# Patient Record
Sex: FEMALE | Race: BLACK OR AFRICAN AMERICAN | Employment: FULL TIME | URBAN - METROPOLITAN AREA
[De-identification: names, ages, dates, MRNs, and addresses within clinical notes are randomized per-mention and may not be internally consistent; named-entity substitution may affect disease eponyms.]

---

## 2017-03-08 LAB
HBSAG, EXTERNAL: NORMAL
HIV, EXTERNAL: NONREACTIVE
RUBELLA, EXTERNAL: 12.4
TYPE, ABO & RH, EXTERNAL: NORMAL

## 2017-07-27 LAB
ANTIBODY SCREEN, EXTERNAL: NORMAL
T. PALLIDUM, EXTERNAL: NORMAL

## 2017-09-15 LAB — GRBS, EXTERNAL: NORMAL

## 2017-10-05 ENCOUNTER — ANESTHESIA (OUTPATIENT)
Dept: LABOR AND DELIVERY | Age: 32
End: 2017-10-05
Payer: COMMERCIAL

## 2017-10-05 ENCOUNTER — HOSPITAL ENCOUNTER (INPATIENT)
Age: 32
LOS: 3 days | Discharge: HOME | End: 2017-10-08
Attending: OBSTETRICS & GYNECOLOGY | Admitting: OBSTETRICS & GYNECOLOGY
Payer: COMMERCIAL

## 2017-10-05 ENCOUNTER — ANESTHESIA EVENT (OUTPATIENT)
Dept: LABOR AND DELIVERY | Age: 32
End: 2017-10-05
Payer: COMMERCIAL

## 2017-10-05 PROBLEM — Z34.90 PREGNANCY: Status: ACTIVE | Noted: 2017-10-05

## 2017-10-05 LAB
A1 MICROGLOB PLACENTAL VAG QL: POSITIVE
BASOPHILS # BLD: 0 K/UL (ref 0–0.1)
BASOPHILS NFR BLD: 0 % (ref 0–1)
CONTROL LINE PRESENT?: YES
EOSINOPHIL # BLD: 0.1 K/UL (ref 0–0.4)
EOSINOPHIL NFR BLD: 1 % (ref 0–7)
ERYTHROCYTE [DISTWIDTH] IN BLOOD BY AUTOMATED COUNT: 12.7 % (ref 11.5–14.5)
EXPIRATION DATE: NORMAL
HCT VFR BLD AUTO: 38.9 % (ref 35–47)
HGB BLD-MCNC: 13.5 G/DL (ref 11.5–16)
INTERNAL NEGATIVE CONTROL: NEGATIVE
KIT LOT NO.: NORMAL
LYMPHOCYTES # BLD: 2 K/UL (ref 0.8–3.5)
LYMPHOCYTES NFR BLD: 22 % (ref 12–49)
MCH RBC QN AUTO: 32.8 PG (ref 26–34)
MCHC RBC AUTO-ENTMCNC: 34.7 G/DL (ref 30–36.5)
MCV RBC AUTO: 94.4 FL (ref 80–99)
MONOCYTES # BLD: 0.9 K/UL (ref 0–1)
MONOCYTES NFR BLD: 10 % (ref 5–13)
NEUTS SEG # BLD: 6.1 K/UL (ref 1.8–8)
NEUTS SEG NFR BLD: 67 % (ref 32–75)
PLATELET # BLD AUTO: 155 K/UL (ref 150–400)
RBC # BLD AUTO: 4.12 M/UL (ref 3.8–5.2)
WBC # BLD AUTO: 9.1 K/UL (ref 3.6–11)

## 2017-10-05 PROCEDURE — 74011250636 HC RX REV CODE- 250/636: Performed by: ANESTHESIOLOGY

## 2017-10-05 PROCEDURE — 75410000002 HC LABOR FEE PER 1 HR

## 2017-10-05 PROCEDURE — 84112 EVAL AMNIOTIC FLUID PROTEIN: CPT | Performed by: OBSTETRICS & GYNECOLOGY

## 2017-10-05 PROCEDURE — 36415 COLL VENOUS BLD VENIPUNCTURE: CPT | Performed by: OBSTETRICS & GYNECOLOGY

## 2017-10-05 PROCEDURE — 74011250636 HC RX REV CODE- 250/636: Performed by: OBSTETRICS & GYNECOLOGY

## 2017-10-05 PROCEDURE — 4A0HXCZ MEASUREMENT OF PRODUCTS OF CONCEPTION, CARDIAC RATE, EXTERNAL APPROACH: ICD-10-PCS | Performed by: OBSTETRICS & GYNECOLOGY

## 2017-10-05 PROCEDURE — 85025 COMPLETE CBC W/AUTO DIFF WBC: CPT | Performed by: OBSTETRICS & GYNECOLOGY

## 2017-10-05 PROCEDURE — 74011250636 HC RX REV CODE- 250/636

## 2017-10-05 PROCEDURE — 65410000002 HC RM PRIVATE OB

## 2017-10-05 PROCEDURE — 77030034849

## 2017-10-05 PROCEDURE — 99282 EMERGENCY DEPT VISIT SF MDM: CPT

## 2017-10-05 PROCEDURE — 77030007880 HC KT SPN EPDRL BBMI -B

## 2017-10-05 PROCEDURE — 77030014125 HC TY EPDRL BBMI -B: Performed by: ANESTHESIOLOGY

## 2017-10-05 PROCEDURE — 76060000078 HC EPIDURAL ANESTHESIA

## 2017-10-05 RX ORDER — NALOXONE HYDROCHLORIDE 0.4 MG/ML
0.4 INJECTION, SOLUTION INTRAMUSCULAR; INTRAVENOUS; SUBCUTANEOUS AS NEEDED
Status: DISCONTINUED | OUTPATIENT
Start: 2017-10-05 | End: 2017-10-06 | Stop reason: HOSPADM

## 2017-10-05 RX ORDER — OXYTOCIN IN 5 % DEXTROSE 30/500 ML
PLASTIC BAG, INJECTION (ML) INTRAVENOUS
Status: DISCONTINUED
Start: 2017-10-05 | End: 2017-10-05

## 2017-10-05 RX ORDER — BUPIVACAINE HYDROCHLORIDE AND EPINEPHRINE 2.5; 5 MG/ML; UG/ML
INJECTION, SOLUTION EPIDURAL; INFILTRATION; INTRACAUDAL; PERINEURAL
Status: DISPENSED
Start: 2017-10-05 | End: 2017-10-06

## 2017-10-05 RX ORDER — BUPIVACAINE HYDROCHLORIDE AND EPINEPHRINE 2.5; 5 MG/ML; UG/ML
INJECTION, SOLUTION EPIDURAL; INFILTRATION; INTRACAUDAL; PERINEURAL AS NEEDED
Status: DISCONTINUED | OUTPATIENT
Start: 2017-10-05 | End: 2017-10-06 | Stop reason: HOSPADM

## 2017-10-05 RX ORDER — CALCIUM CARBONATE 200(500)MG
200 TABLET,CHEWABLE ORAL
Status: DISCONTINUED | OUTPATIENT
Start: 2017-10-06 | End: 2017-10-08 | Stop reason: HOSPADM

## 2017-10-05 RX ORDER — SODIUM CHLORIDE, SODIUM LACTATE, POTASSIUM CHLORIDE, CALCIUM CHLORIDE 600; 310; 30; 20 MG/100ML; MG/100ML; MG/100ML; MG/100ML
125 INJECTION, SOLUTION INTRAVENOUS CONTINUOUS
Status: DISCONTINUED | OUTPATIENT
Start: 2017-10-05 | End: 2017-10-06 | Stop reason: SDUPTHER

## 2017-10-05 RX ORDER — FENTANYL/BUPIVACAINE/NS/PF 2-1250MCG
1-16 PREFILLED PUMP RESERVOIR EPIDURAL CONTINUOUS
Status: DISCONTINUED | OUTPATIENT
Start: 2017-10-05 | End: 2017-10-06 | Stop reason: HOSPADM

## 2017-10-05 RX ORDER — FENTANYL CITRATE 50 UG/ML
INJECTION, SOLUTION INTRAMUSCULAR; INTRAVENOUS
Status: DISPENSED
Start: 2017-10-05 | End: 2017-10-06

## 2017-10-05 RX ORDER — OXYTOCIN IN 5 % DEXTROSE 30/500 ML
2 PLASTIC BAG, INJECTION (ML) INTRAVENOUS
Status: DISCONTINUED | OUTPATIENT
Start: 2017-10-05 | End: 2017-10-06 | Stop reason: HOSPADM

## 2017-10-05 RX ORDER — SODIUM CHLORIDE 0.9 % (FLUSH) 0.9 %
5-10 SYRINGE (ML) INJECTION AS NEEDED
Status: DISCONTINUED | OUTPATIENT
Start: 2017-10-05 | End: 2017-10-06 | Stop reason: HOSPADM

## 2017-10-05 RX ORDER — SODIUM CHLORIDE 0.9 % (FLUSH) 0.9 %
5-10 SYRINGE (ML) INJECTION AS NEEDED
Status: DISCONTINUED | OUTPATIENT
Start: 2017-10-05 | End: 2017-10-08 | Stop reason: HOSPADM

## 2017-10-05 RX ORDER — SODIUM CHLORIDE 0.9 % (FLUSH) 0.9 %
5-10 SYRINGE (ML) INJECTION EVERY 8 HOURS
Status: DISCONTINUED | OUTPATIENT
Start: 2017-10-05 | End: 2017-10-08 | Stop reason: HOSPADM

## 2017-10-05 RX ORDER — SODIUM CHLORIDE 0.9 % (FLUSH) 0.9 %
5-10 SYRINGE (ML) INJECTION EVERY 8 HOURS
Status: DISCONTINUED | OUTPATIENT
Start: 2017-10-05 | End: 2017-10-06 | Stop reason: HOSPADM

## 2017-10-05 RX ORDER — FENTANYL/BUPIVACAINE/NS/PF 2-1250MCG
PREFILLED PUMP RESERVOIR EPIDURAL
Status: COMPLETED
Start: 2017-10-05 | End: 2017-10-05

## 2017-10-05 RX ADMIN — BUPIVACAINE HYDROCHLORIDE AND EPINEPHRINE 5 ML: 2.5; 5 INJECTION, SOLUTION EPIDURAL; INFILTRATION; INTRACAUDAL; PERINEURAL at 12:45

## 2017-10-05 RX ADMIN — FENTANYL 0.2 MG/100ML-BUPIV 0.125%-NACL 0.9% EPIDURAL INJ 12 ML/HR: 2/0.125 SOLUTION at 20:15

## 2017-10-05 RX ADMIN — BUPIVACAINE HYDROCHLORIDE AND EPINEPHRINE 0.5 ML: 2.5; 5 INJECTION, SOLUTION EPIDURAL; INFILTRATION; INTRACAUDAL; PERINEURAL at 12:44

## 2017-10-05 RX ADMIN — Medication 2 MILLI-UNITS/MIN: at 20:11

## 2017-10-05 RX ADMIN — BUPIVACAINE HYDROCHLORIDE AND EPINEPHRINE 5 ML: 2.5; 5 INJECTION, SOLUTION EPIDURAL; INFILTRATION; INTRACAUDAL; PERINEURAL at 12:50

## 2017-10-05 RX ADMIN — FENTANYL 0.2 MG/100ML-BUPIV 0.125%-NACL 0.9% EPIDURAL INJ 12 ML/HR: 2/0.125 SOLUTION at 12:59

## 2017-10-05 RX ADMIN — SODIUM CHLORIDE, SODIUM LACTATE, POTASSIUM CHLORIDE, AND CALCIUM CHLORIDE 125 ML/HR: 600; 310; 30; 20 INJECTION, SOLUTION INTRAVENOUS at 06:38

## 2017-10-05 RX ADMIN — SODIUM CHLORIDE, SODIUM LACTATE, POTASSIUM CHLORIDE, AND CALCIUM CHLORIDE 125 ML/HR: 600; 310; 30; 20 INJECTION, SOLUTION INTRAVENOUS at 12:28

## 2017-10-05 RX ADMIN — SODIUM CHLORIDE, SODIUM LACTATE, POTASSIUM CHLORIDE, AND CALCIUM CHLORIDE 125 ML/HR: 600; 310; 30; 20 INJECTION, SOLUTION INTRAVENOUS at 19:40

## 2017-10-05 NOTE — PROGRESS NOTES
1022: Dr. Parish Godinez at bedside. SVE performed. 10/05/17 1022   Cervical Exam   Dilation (cm) 2   Eff 80 %   Station -1   Position Posterior   Vaginal exam done by? Dr. Parish Godinez     POC epidural if needed.

## 2017-10-05 NOTE — PROGRESS NOTES
0715: Bedside report received from SHANTHI Luna RN. Will assume care. 0745: Pt taken off EFM. Sitting on ball in room with .

## 2017-10-05 NOTE — ANESTHESIA PREPROCEDURE EVALUATION
Anesthetic History   No history of anesthetic complications            Review of Systems / Medical History  Patient summary reviewed, nursing notes reviewed and pertinent labs reviewed    Pulmonary  Within defined limits                 Neuro/Psych   Within defined limits           Cardiovascular  Within defined limits                Exercise tolerance: >4 METS     GI/Hepatic/Renal  Within defined limits              Endo/Other  Within defined limits           Other Findings   Comments: IUP           Physical Exam    Airway  Mallampati: II  TM Distance: 4 - 6 cm  Neck ROM: normal range of motion   Mouth opening: Normal     Cardiovascular               Dental  No notable dental hx       Pulmonary                 Abdominal         Other Findings            Anesthetic Plan    ASA: 2  Anesthesia type: spinal and epidural            Anesthetic plan and risks discussed with: Patient

## 2017-10-05 NOTE — PROGRESS NOTES
PROM around 2300  GBS negative  Managing expectantly.      Cvx: 2/80/-1  [place]: irregular  FHR: 120s, category 1    Pain management options reviewed  Continue expectant management of labor

## 2017-10-05 NOTE — PROGRESS NOTES
Late entyr 56  admitted to triage with c/o LOF since 2300 clear fluid- amniosure was done and positive. Pt denies any problems withpregnancy.  Is planning a \"nonmedicated delivery\" pt admits to [facility]

## 2017-10-05 NOTE — PROGRESS NOTES
1500: Dr. Suukmar Valverde at bedside. SVE performed. 10/05/17 1500   Cervical Exam   Dilation (cm) 4   Eff 100 %   Station 0   Vaginal exam done by? Dr. Sukumar Valverde     POC continue position changes every hour. Will continue to monitor.

## 2017-10-05 NOTE — PROGRESS NOTES
1235: Dr. Danielle Nguyen at bedside. Epidural discussed, opportunities for questions given. Understanding voiced. Timeout performed. Successful epidural performed.

## 2017-10-05 NOTE — IP AVS SNAPSHOT
Summary of Care Report The Summary of Care report has been created to help improve care coordination. Users with access to [employer] or [address] (Web-based application) may access additional patient information including the Discharge Summary. If you are not currently a [address] user and need more information, please call the number listed below in the [place] [address] section and ask to be connected with Medical Records. Facility Information Name Address Phone [address] [address] [phone] Patient Information Patient Name Sex DOB Haynes Ormond ([id number]) Female 1985 Discharge Information Admitting Provider Service Area Unit Tej Terrazas MD / [phone] [address] [facility] 3 Mother Infant / [phone] Discharge Provider Discharge Date/Time Discharge Disposition Destination (none) 10/8/2017 Midday (Pending) AHR (none) Patient Language Language ENGLISH [13] Hospital Problems as of 10/8/2017  Reviewed: 10/6/2017 10:43 AM by Breanne Mcintyre MD  
  
  
  
 Class Noted - Resolved Last Modified POA Active Problems Pregnancy  10/5/2017 - Present 10/5/2017 by Tej Terrazas MD Unknown Entered by Tej Terrazas MD  
  
Non-Hospital Problems as of 10/8/2017  Reviewed: 10/6/2017 10:43 AM by Breanne Mcintyre MD  
 None You are allergic to the following Allergen Reactions Benadryl (Diphenhydramine Hcl) Anaphylaxis Current Discharge Medication List  
  
START taking these medications Dose & Instructions Dispensing Information Comments  
 ferrous sulfate 325 mg (65 mg iron) tablet Dose:  325 mg Take 1 Tab by mouth daily (after breakfast). Quantity:  60 Tab Refills:  0  
   
 ibuprofen 600 mg tablet Commonly known as:  MOTRIN  Dose:  600 mg  
 Take 1 Tab by mouth every six (6) hours as needed for Pain. Quantity:  40 Tab Refills:  2  
   
 oxyCODONE-acetaminophen 5-325 mg per tablet Commonly known as:  PERCOCET Dose:  1 Tab Take 1 Tab by mouth every six (6) hours as needed for Pain. Max Daily Amount: 4 Tabs. Quantity:  30 Tab Refills:  0 CONTINUE these medications which have NOT CHANGED Dose & Instructions Dispensing Information Comments PNV38-Iron Cbn&Gluc-FA-DSS-DHA 35-1- mg Cmpk Take  by mouth. Refills:  0 Surgery Information ID Date/Time Status Primary Surgeon All Procedures Location [id number] 10/5/2017 Unposted   [facility] - DO NOT SCHEDULE    
 [id number] 10/6/2017 1100 Unposted Aria Hawkins MD  SECTION [facility] L[facility] OR Follow-up Information Follow up With Details Comments Contact Info Dr. Dinora Holliday In 6 weeks postpartum appointment Provider Unknown   Patient not available to ask Discharge Instructions POST DELIVERY DISCHARGE INSTRUCTIONS Name: Peyman George YOB: 1985 Primary Diagnosis: Active Problems: 
  Pregnancy (10/5/2017) General:  
 
Diet/Diet Restrictions: 
· Eight 8-ounce glasses of fluid daily (water, juices); avoid excessive caffeine intake. · Meals/snacks as desired which are high in fiber and carbohydrates and low in fat and cholesterol. Physical Activity / Restrictions / Safety: · Avoid heavy lifting, no more that 8 lbs. For 2-3 weeks;  
· Limit use of stairs to 2 times daily for the first week home. · No driving for one week. · Avoid intercourse 4-6 weeks, no douching or tampon use. · Check with obstetrician before starting or resuming an exercise program.   
 
Discharge Instructions/Special Treatment/Home Care Needs:  
 
· Continue prenatal vitamins. · Continue to use squirt bottle with warm water on your episiotomy after each bathroom use until bleeding stops. · If steri-strips applied to your incision, remove in 7-10 days. Call your doctor for the following: · Fever over 101 degrees by mouth. · Vaginal bleeding heavier than a normal menstrual period or clots larger than a golf ball. · Red streaks or increased swelling of legs, painful red streaks on your breast. 
· Painful urination, constipation and increased pain or swelling or discharge with your incision. · If you feel extremely anxious or overwhelmed. · If you have thoughts of harming yourself and/or your baby. Pain Management:  
 
· Take Acetaminophen (Tylenol) or Ibuprofen (Advil, Motrin), as directed for pain. · Use a warm Sitz bath 3 times daily to relieve episiotomy or hemorrhoidal discomfort. · For hemorrhoidal discomfort, use Tucks and Anusol cream as needed and directed. · Heating pad to  incision as needed. Follow-Up Care:  
 
Appointment with MD: Follow-up Appointments Procedures  FOLLOW UP VISIT Appointment in: 6 Weeks Standing Status:   Standing Number of Occurrences:   1 Order Specific Question:   Appointment in Answer:   6 Weeks Telephone number: [phone] Signed By: Linda Jerry MD                                                                                                   Date: 10/8/2017 Time: 11:01 AM 
 
Chart Review Routing History Recipient Method Report Sent By Mattie Robb Provider Gretchen, MD  
Patient not available to ask [address] Mail IP Auto Routed Notes Aditi Hdz MD [[id number]] 10/5/2017  3:36 AM 10/05/2017 Provider Unknown, MD  
Patient not available to ask [address] [employer] Mail IP Auto Routed Notes Linda Jerry MD [[id number]] 10/8/2017 11:01 AM 10/08/2017

## 2017-10-05 NOTE — H&P
EDC:10/12/2017  EGA: 39 weeks, 0 days      History of Present Illness:  28year old  at 39w0d presents with PROM clear fluid about 2300 tonight. A little spotting initially, none since. Baby active. Denies significant problems with this pregnancy. GBS negative. Patient's Prenatal Care with Doctor of Record Dayanara Chowdhury MD Notable For -    Leukorrhea  DM increased risk early GS (nml)   lab screening  Normal pregnancy primigravida  PCOS  Hirsutism      Review of Systems     Except as noted in the HPI, the review of systems is negative for General, Breast, , CV, Resp, GI, Endo, MS, Derm, Neuro, Psych, Eyes, ENT, Allergy and Heme. Allergies    BENADRYL (Moderate)      Medications Removed from Medication List        Flowsheet View for Follow-up Visit     Estimated weeks of        gestation:  39 0/7     Blood pressure: 122 / 82     FHR:   130s     Fetal position:  vertex     Cx Dilation:  FT     Cx Effacement: 80%     Cx Station:  -1      Vital Signs    Blood Pressure: 122 / 82  Temperature:  98.6 deg.  F.  Pulse rate: 87 / minute  Resp rate: 20 / minute    FHT Descrip:    reactive  Contractions:  yes  UC Frequency:  occasional          Physical Exam     General           General appearance:  no acute distress    Chest           Lungs:  clear to auscultation          Heart:  regular rate and rhythm    Extremeties           Extremeties:  0 edema    Neurological           Reflexes:  2+ and symmetric with no pathological reflexes    Psych           Orientation:  oriented to time, place, and person    Abdomen           Abdomen:  gravid; soft; nontender          EFW:  7.5#    Pelvic Exam           EGBUS:  no lesions          Vagina:  small amount clear fluid                  Dilation: : FT                  Effacement:  80%                  Station:  -1                  Presentation:  vertex                  Membranes:  ruptured                  Pelvis:  adequate Consistency:  soft                  Position: posterior    Other Physical Exam Findings           Amnisure positive            Impression & Recommendations:    Problem # 1:  ROM at term (FCM-496.08) (WTY17-I22.73)  Assessment: New  [facility]  GBS negative  Discussed management--patient wants to manage expectantly for now. Other Orders:  Sent to L&D (CPT-AdmitF)      Medications (at conclusion of this visit)    05/01/2017 ERYTHROMYCIN 2 % EXT GEL (ERYTHROMYCIN) Prescribed by arnie Steele/70Geno Yang MD  12/09/2016 PRENATAL COMPLETE TABS (PRENATAL VIT-FE FUMARATE-FA TABS) over the counter          LABORATORY DATA   TEST DATE RESULT   Group B Strep culture 09/15/2017 Negative                                   (Group B Strep Culture Result Field)   Blood Type 03/08/2017 B                                             (Blood Type Result Field)   Rh 03/08/2017 Positive                                   (Rh Result Field)   Rhogam Inj Given     Tdap Vaccine Given 07/27/2017 Vacc. [address]   Antibody Screen 07/27/2017 Negative   Rubella  [facility] Reference Ranges On or After 3/10/14                  <0.90              Non-immune      0.90 - 0.99     Equivocal      >0.99              Immune    [facility] Reference Ranges  Before 3/10/14           <5                 Non-immune             5 - 9               Equivocal            >9                 Immune  [facility] Reference Ranges       < Or = 0.90       Negative             0.91-1.09          Equivocal            > Or = 1.10       Positive   03/08/2017     12.40     TPA (T Pallidum Antibodies) 07/27/2017 Negative   Serology (RPR) 05/08/2013 Non Reactive   HBsAg 03/08/2017 Negative   HIV 03/08/2017 Non Reactive   Hemoglobin 07/27/2017 12.9   Hematocrit 07/27/2017 37.8   Platelets 09/74/5028 168 X10E3/UL   TSH 10/10/2016 1.250   Urine Culture 02/21/2017 Negative   GC DNA Probe 02/21/2017 Negative   Chlamydia DNA 02/21/2017 Negative   PAP 06/02/2015 NIL   Flu Vaccine Given 09/20/2017 Vacc.  [address]   HGBA1C 12/19/2016 5.3   HGB Electro 03/08/2017 AA   T4, Free     BG Fasting     GTT 1H 50G 07/27/2017 123   GTT 1H 100G     GTT 2H 100G     GTT 3H 100G     Glucose Plasma     CF Accept or Decline 03/08/2017 declined   CF Screen Result     Nuchal Trans 05/31/2017 4.61^4. 61 mm&millimeters   AFP Only 05/01/2017 *Screen Negative*   Tetra     AFP Serum     CVS 03/08/2017 declined   AFP Amniotic     Amnio Karyo 03/08/2017 declined   FISH     GC Culture     Chlamydia Cult     Ureaplasma     Mycoplasma     WBC 03/08/2017 5.9 X10E3/UL   RBC 03/08/2017 4.22 X10E6/UL   MCV 03/08/2017 93   MCH 03/08/2017 31.8   MCHC RBC 03/08/2017 34.0     ULTRASOUND DATA   TEST DATE RESULT   Estimated Fetal Weight 09/06/2017 1572^5999 g&grams                                     Weight % 09/06/2017 59^59% %&percent                                                ALMA DELIA 09/06/2017 18.13^18.1 cm&centimeters                    BPP 09/06/2017 8^8 [n/a]&Not applicable   Cervical Length (mm)             Electronically signed by Nelson Manley MD  on 10/05/2017 at 3:35 AM    ________________________________________________________________________

## 2017-10-05 NOTE — ANESTHESIA PROCEDURE NOTES
CSE Block    Performed by: Keshia Ham  Authorized by: Minh Colin     Pre-Procedure      OB Combined Spinal-Epidural Note    Risk and benefits were discussed with the patient and plans are to proceed. Patient was placed in the seated position. The back was prepped at the lumbar region with Duraprep. 3 ml 0.25% bupivacaine with 1:200K epinephrine  was used as local at L3 - L4. A 17 Tuohy needle was passed x 1 attempt(s) at the above stated level. Loss of resistance at 5 cm. A 25 g pencil point spinal needle was placed through the Touhy until CSF was obtained. 0.5 mL 0.25% bupivacaine with 1:200K epinephrine was deposited into the CSF. A 19 g spring type epidural catheter was placed through the Touhy and secured at 11 cm at the skin. Test dose with 5 mL 0.25% bupivacaine with 1:200 epinephrine was negative. No paresthesia.     Urban Ross MD  10/5/2017

## 2017-10-05 NOTE — IP AVS SNAPSHOT
[address] [facility] 
[phone] Patient: Peyman George MRN: [id number] CIQ:2/57/8180 You are allergic to the following Allergen Reactions Benadryl (Diphenhydramine Hcl) Anaphylaxis Recent Documentation Height Weight Breastfeeding? BMI OB Status Smoking Status 1.676 m 82.6 kg Unknown 29.38 kg/m2 Recent pregnancy Never Smoker Unresulted Labs Order Current Status SAMPLE TO BLOOD BANK In process RUPTURE OF FETAL MEMBRANES, POC Preliminary result Emergency Contacts Name Discharge Info Relation Home Work Mobile Aime Jerry  Spouse [3] [phone] About your hospitalization You were admitted on:  October 5, 2017 You last received care in the:  [facility] You were discharged on:  October 8, 2017 Unit phone number:  [phone] Why you were hospitalized Your primary diagnosis was:  Not on File Your diagnoses also included:  Pregnancy Providers Seen During Your Hospitalizations Provider Role Specialty Primary office phone Benjamin Jensen MD Attending Provider Obstetrics & Gynecology [phone] Your Primary Care Physician (PCP) Primary Care Physician Office Phone Office Fax UNKNOWN, PROVIDER ** None ** ** None ** Follow-up Information Follow up With Details Comments Contact Info Dr. Dinora Holliday In 6 weeks postpartum appointment Provider Unknown   Patient not available to ask Current Discharge Medication List  
  
START taking these medications Dose & Instructions Dispensing Information Comments Morning Noon Evening Bedtime  
 ferrous sulfate 325 mg (65 mg iron) tablet Your last dose was: Your next dose is:    
   
   
 Dose:  325 mg Take 1 Tab by mouth daily (after breakfast). Quantity:  60 Tab Refills:  0 ibuprofen 600 mg tablet Commonly known as:  MOTRIN Your last dose was: Your next dose is:    
   
   
 Dose:  600 mg Take 1 Tab by mouth every six (6) hours as needed for Pain. Quantity:  40 Tab Refills:  2  
     
   
   
   
  
 oxyCODONE-acetaminophen 5-325 mg per tablet Commonly known as:  PERCOCET Your last dose was: Your next dose is:    
   
   
 Dose:  1 Tab Take 1 Tab by mouth every six (6) hours as needed for Pain. Max Daily Amount: 4 Tabs. Quantity:  30 Tab Refills:  0 CONTINUE these medications which have NOT CHANGED Dose & Instructions Dispensing Information Comments Morning Noon Evening Bedtime PNV38-Iron Cbn&Gluc-FA-DSS-DHA 35-1- mg Cmpk Your last dose was: Your next dose is: Take  by mouth. Refills:  0 Where to Get Your Medications Information on where to get these meds will be given to you by the nurse or doctor. ! Ask your nurse or doctor about these medications  
  ferrous sulfate 325 mg (65 mg iron) tablet  
 ibuprofen 600 mg tablet  
 oxyCODONE-acetaminophen 5-325 mg per tablet Discharge Instructions POST DELIVERY DISCHARGE INSTRUCTIONS Name: Nicole Gao YOB: 1985 Primary Diagnosis: Active Problems: 
  Pregnancy (10/5/2017) General:  
 
Diet/Diet Restrictions: 
· Eight 8-ounce glasses of fluid daily (water, juices); avoid excessive caffeine intake. · Meals/snacks as desired which are high in fiber and carbohydrates and low in fat and cholesterol. Physical Activity / Restrictions / Safety: · Avoid heavy lifting, no more that 8 lbs. For 2-3 weeks;  
· Limit use of stairs to 2 times daily for the first week home. · No driving for one week. · Avoid intercourse 4-6 weeks, no douching or tampon use.   
· Check with obstetrician before starting or resuming an exercise program.   
 
 Discharge Instructions/Special Treatment/Home Care Needs:  
 
· Continue prenatal vitamins. · Continue to use squirt bottle with warm water on your episiotomy after each bathroom use until bleeding stops. · If steri-strips applied to your incision, remove in 7-10 days. Call your doctor for the following: · Fever over 101 degrees by mouth. · Vaginal bleeding heavier than a normal menstrual period or clots larger than a golf ball. · Red streaks or increased swelling of legs, painful red streaks on your breast. 
· Painful urination, constipation and increased pain or swelling or discharge with your incision. · If you feel extremely anxious or overwhelmed. · If you have thoughts of harming yourself and/or your baby. Pain Management:  
 
· Take Acetaminophen (Tylenol) or Ibuprofen (Advil, Motrin), as directed for pain. · Use a warm Sitz bath 3 times daily to relieve episiotomy or hemorrhoidal discomfort. · For hemorrhoidal discomfort, use Tucks and Anusol cream as needed and directed. · Heating pad to  incision as needed. Follow-Up Care:  
 
Appointment with MD: Follow-up Appointments Procedures  FOLLOW UP VISIT Appointment in: 6 Weeks Standing Status:   Standing Number of Occurrences:   1 Order Specific Question:   Appointment in Answer:   6 Weeks Telephone number: [phone] Signed By: Luz Elena Curiel MD                                                                                                   Date: 10/8/2017 Time: 11:01 AM 
 
Discharge Orders None Phonitive - [employer] Announcement We are excited to announce that we are making your provider's discharge notes available to you in [employer]. You will see these notes when they are completed and signed by the physician that discharged you from your recent hospital stay.   If you have any questions or concerns about any information you see in [employer], please call the [employer] Please provide this summary of care documentation to your next provider. Patient Signature:  ____________________________________________________________ Date:  ____________________________________________________________  
  
Gunnar Mais Provider Signature:  ____________________________________________________________ Date:  ____________________________________________________________

## 2017-10-05 NOTE — PROGRESS NOTES
Pt is starting to be more aware of her contractions- getting a  little restless with the contractions.  Encouragement given

## 2017-10-05 NOTE — PROGRESS NOTES
1600: Deep variable/late noted. Turned L lateral & trendelenberg. Baby recovered well. . Will continue to monitor.

## 2017-10-05 NOTE — PROGRESS NOTES
Bedside shift change report given to SHANTHI Barnes RN by FRANCIA Stanton RN. Report included the following information SBAR, Kardex, Intake/Output, MAR, Accordion, Recent Results and Med Rec Status. Hourly Rounding performed by MUSTAPHA White RN

## 2017-10-06 PROCEDURE — 74011250636 HC RX REV CODE- 250/636: Performed by: ANESTHESIOLOGY

## 2017-10-06 PROCEDURE — 77030031139 HC SUT VCRL2 J&J -A

## 2017-10-06 PROCEDURE — 74011000250 HC RX REV CODE- 250

## 2017-10-06 PROCEDURE — 77010026065 HC OXYGEN MINIMUM MEDICAL AIR

## 2017-10-06 PROCEDURE — 65410000002 HC RM PRIVATE OB

## 2017-10-06 PROCEDURE — 74011250636 HC RX REV CODE- 250/636: Performed by: OBSTETRICS & GYNECOLOGY

## 2017-10-06 PROCEDURE — 74011250636 HC RX REV CODE- 250/636

## 2017-10-06 PROCEDURE — 76010000391 HC C SECN FIRST 1 HR: Performed by: OBSTETRICS & GYNECOLOGY

## 2017-10-06 PROCEDURE — 76060000078 HC EPIDURAL ANESTHESIA: Performed by: OBSTETRICS & GYNECOLOGY

## 2017-10-06 PROCEDURE — 76010000392 HC C SECN EA ADDL 0.5 HR: Performed by: OBSTETRICS & GYNECOLOGY

## 2017-10-06 PROCEDURE — 77030018809 HC RETRCTR ALXSO DISP AMR -B

## 2017-10-06 PROCEDURE — 77030010848 HC CATH INTUTR PRSS KOLB -B

## 2017-10-06 PROCEDURE — 76060000033 HC ANESTHESIA 1 TO 1.5 HR: Performed by: OBSTETRICS & GYNECOLOGY

## 2017-10-06 PROCEDURE — 77030018836 HC SOL IRR NACL ICUM -A

## 2017-10-06 PROCEDURE — 77030008459 HC STPLR SKN COOP -B

## 2017-10-06 PROCEDURE — 75410000002 HC LABOR FEE PER 1 HR

## 2017-10-06 PROCEDURE — 77030002933 HC SUT MCRYL J&J -A

## 2017-10-06 PROCEDURE — 77030011640 HC PAD GRND REM COVD -A

## 2017-10-06 PROCEDURE — 75410000003 HC RECOV DEL/VAG/CSECN EA 0.5 HR

## 2017-10-06 RX ORDER — IBUPROFEN 400 MG/1
800 TABLET ORAL EVERY 8 HOURS
Status: DISCONTINUED | OUTPATIENT
Start: 2017-10-06 | End: 2017-10-08 | Stop reason: HOSPADM

## 2017-10-06 RX ORDER — ONDANSETRON 2 MG/ML
4 INJECTION INTRAMUSCULAR; INTRAVENOUS
Status: DISCONTINUED | OUTPATIENT
Start: 2017-10-06 | End: 2017-10-08 | Stop reason: HOSPADM

## 2017-10-06 RX ORDER — OXYTOCIN/RINGER'S LACTATE 20/1000 ML
PLASTIC BAG, INJECTION (ML) INTRAVENOUS
Status: DISCONTINUED | OUTPATIENT
Start: 2017-10-06 | End: 2017-10-06

## 2017-10-06 RX ORDER — OXYCODONE AND ACETAMINOPHEN 5; 325 MG/1; MG/1
1 TABLET ORAL
Status: DISCONTINUED | OUTPATIENT
Start: 2017-10-06 | End: 2017-10-08 | Stop reason: HOSPADM

## 2017-10-06 RX ORDER — SODIUM CHLORIDE 9 MG/ML
150 INJECTION, SOLUTION INTRAVENOUS CONTINUOUS
Status: DISCONTINUED | OUTPATIENT
Start: 2017-10-06 | End: 2017-10-08 | Stop reason: HOSPADM

## 2017-10-06 RX ORDER — KETOROLAC TROMETHAMINE 30 MG/ML
30 INJECTION, SOLUTION INTRAMUSCULAR; INTRAVENOUS
Status: ACTIVE | OUTPATIENT
Start: 2017-10-06 | End: 2017-10-07

## 2017-10-06 RX ORDER — NALOXONE HYDROCHLORIDE 0.4 MG/ML
0.4 INJECTION, SOLUTION INTRAMUSCULAR; INTRAVENOUS; SUBCUTANEOUS AS NEEDED
Status: CANCELLED | OUTPATIENT
Start: 2017-10-06

## 2017-10-06 RX ORDER — CEFAZOLIN SODIUM IN 0.9 % NACL 2 G/100 ML
PLASTIC BAG, INJECTION (ML) INTRAVENOUS
Status: DISPENSED
Start: 2017-10-06 | End: 2017-10-06

## 2017-10-06 RX ORDER — SODIUM CHLORIDE, SODIUM LACTATE, POTASSIUM CHLORIDE, CALCIUM CHLORIDE 600; 310; 30; 20 MG/100ML; MG/100ML; MG/100ML; MG/100ML
125 INJECTION, SOLUTION INTRAVENOUS CONTINUOUS
Status: DISCONTINUED | OUTPATIENT
Start: 2017-10-06 | End: 2017-10-08 | Stop reason: HOSPADM

## 2017-10-06 RX ORDER — MORPHINE SULFATE 0.5 MG/ML
INJECTION, SOLUTION EPIDURAL; INTRATHECAL; INTRAVENOUS AS NEEDED
Status: DISCONTINUED | OUTPATIENT
Start: 2017-10-06 | End: 2017-10-06 | Stop reason: HOSPADM

## 2017-10-06 RX ORDER — LIDOCAINE HYDROCHLORIDE AND EPINEPHRINE 20; 5 MG/ML; UG/ML
INJECTION, SOLUTION EPIDURAL; INFILTRATION; INTRACAUDAL; PERINEURAL AS NEEDED
Status: DISCONTINUED | OUTPATIENT
Start: 2017-10-06 | End: 2017-10-06

## 2017-10-06 RX ORDER — LIDOCAINE HYDROCHLORIDE AND EPINEPHRINE 20; 5 MG/ML; UG/ML
INJECTION, SOLUTION EPIDURAL; INFILTRATION; INTRACAUDAL; PERINEURAL
Status: DISPENSED
Start: 2017-10-06 | End: 2017-10-07

## 2017-10-06 RX ORDER — OXYTOCIN/RINGER'S LACTATE 20/1000 ML
125-500 PLASTIC BAG, INJECTION (ML) INTRAVENOUS ONCE
Status: ACTIVE | OUTPATIENT
Start: 2017-10-06 | End: 2017-10-07

## 2017-10-06 RX ORDER — OXYTOCIN/RINGER'S LACTATE 20/1000 ML
PLASTIC BAG, INJECTION (ML) INTRAVENOUS
Status: DISCONTINUED | OUTPATIENT
Start: 2017-10-06 | End: 2017-10-06 | Stop reason: HOSPADM

## 2017-10-06 RX ORDER — KETOROLAC TROMETHAMINE 30 MG/ML
15 INJECTION, SOLUTION INTRAMUSCULAR; INTRAVENOUS EVERY 6 HOURS
Status: CANCELLED | OUTPATIENT
Start: 2017-10-06 | End: 2017-10-07

## 2017-10-06 RX ORDER — NALOXONE HYDROCHLORIDE 0.4 MG/ML
0.4 INJECTION, SOLUTION INTRAMUSCULAR; INTRAVENOUS; SUBCUTANEOUS AS NEEDED
Status: DISCONTINUED | OUTPATIENT
Start: 2017-10-06 | End: 2017-10-08 | Stop reason: HOSPADM

## 2017-10-06 RX ORDER — SIMETHICONE 80 MG
80 TABLET,CHEWABLE ORAL AS NEEDED
Status: DISCONTINUED | OUTPATIENT
Start: 2017-10-06 | End: 2017-10-08 | Stop reason: HOSPADM

## 2017-10-06 RX ORDER — ONDANSETRON 2 MG/ML
INJECTION INTRAMUSCULAR; INTRAVENOUS AS NEEDED
Status: DISCONTINUED | OUTPATIENT
Start: 2017-10-06 | End: 2017-10-06 | Stop reason: HOSPADM

## 2017-10-06 RX ORDER — LIDOCAINE HYDROCHLORIDE AND EPINEPHRINE 20; 5 MG/ML; UG/ML
INJECTION, SOLUTION EPIDURAL; INFILTRATION; INTRACAUDAL; PERINEURAL AS NEEDED
Status: DISCONTINUED | OUTPATIENT
Start: 2017-10-06 | End: 2017-10-06 | Stop reason: HOSPADM

## 2017-10-06 RX ORDER — SODIUM CHLORIDE 0.9 % (FLUSH) 0.9 %
5-10 SYRINGE (ML) INJECTION EVERY 8 HOURS
Status: DISCONTINUED | OUTPATIENT
Start: 2017-10-06 | End: 2017-10-08 | Stop reason: HOSPADM

## 2017-10-06 RX ORDER — PHENYLEPHRINE HCL IN 0.9% NACL 0.4MG/10ML
SYRINGE (ML) INTRAVENOUS AS NEEDED
Status: DISCONTINUED | OUTPATIENT
Start: 2017-10-06 | End: 2017-10-06 | Stop reason: HOSPADM

## 2017-10-06 RX ORDER — CEFAZOLIN SODIUM IN 0.9 % NACL 2 G/100 ML
PLASTIC BAG, INJECTION (ML) INTRAVENOUS AS NEEDED
Status: DISCONTINUED | OUTPATIENT
Start: 2017-10-06 | End: 2017-10-06 | Stop reason: HOSPADM

## 2017-10-06 RX ORDER — ONDANSETRON 2 MG/ML
4 INJECTION INTRAMUSCULAR; INTRAVENOUS
Status: CANCELLED | OUTPATIENT
Start: 2017-10-06

## 2017-10-06 RX ORDER — LIDOCAINE HYDROCHLORIDE AND EPINEPHRINE 20; 5 MG/ML; UG/ML
INJECTION, SOLUTION EPIDURAL; INFILTRATION; INTRACAUDAL; PERINEURAL
Status: DISPENSED
Start: 2017-10-06 | End: 2017-10-06

## 2017-10-06 RX ORDER — SODIUM CHLORIDE 0.9 % (FLUSH) 0.9 %
5-10 SYRINGE (ML) INJECTION AS NEEDED
Status: DISCONTINUED | OUTPATIENT
Start: 2017-10-06 | End: 2017-10-08 | Stop reason: HOSPADM

## 2017-10-06 RX ORDER — ACETAMINOPHEN 10 MG/ML
INJECTION, SOLUTION INTRAVENOUS AS NEEDED
Status: DISCONTINUED | OUTPATIENT
Start: 2017-10-06 | End: 2017-10-06 | Stop reason: HOSPADM

## 2017-10-06 RX ADMIN — Medication 80 MCG: at 11:51

## 2017-10-06 RX ADMIN — Medication: at 11:18

## 2017-10-06 RX ADMIN — Medication 80 MCG: at 11:43

## 2017-10-06 RX ADMIN — SODIUM CHLORIDE 999 ML/HR: 900 INJECTION, SOLUTION INTRAVENOUS at 02:15

## 2017-10-06 RX ADMIN — ONDANSETRON 4 MG: 2 INJECTION INTRAMUSCULAR; INTRAVENOUS at 20:16

## 2017-10-06 RX ADMIN — SODIUM CHLORIDE, SODIUM LACTATE, POTASSIUM CHLORIDE, AND CALCIUM CHLORIDE 125 ML/HR: 600; 310; 30; 20 INJECTION, SOLUTION INTRAVENOUS at 08:05

## 2017-10-06 RX ADMIN — Medication 120 MCG: at 11:41

## 2017-10-06 RX ADMIN — ONDANSETRON 4 MG: 2 INJECTION INTRAMUSCULAR; INTRAVENOUS at 11:43

## 2017-10-06 RX ADMIN — FENTANYL 0.2 MG/100ML-BUPIV 0.125%-NACL 0.9% EPIDURAL INJ 12 ML/HR: 2/0.125 SOLUTION at 03:03

## 2017-10-06 RX ADMIN — SODIUM CHLORIDE, SODIUM LACTATE, POTASSIUM CHLORIDE, AND CALCIUM CHLORIDE 125 ML/HR: 600; 310; 30; 20 INJECTION, SOLUTION INTRAVENOUS at 00:04

## 2017-10-06 RX ADMIN — SODIUM CHLORIDE, SODIUM LACTATE, POTASSIUM CHLORIDE, AND CALCIUM CHLORIDE 125 ML/HR: 600; 310; 30; 20 INJECTION, SOLUTION INTRAVENOUS at 14:59

## 2017-10-06 RX ADMIN — SODIUM CHLORIDE 150 ML/HR: 900 INJECTION, SOLUTION INTRAVENOUS at 05:28

## 2017-10-06 RX ADMIN — ACETAMINOPHEN 1000 MG: 10 INJECTION, SOLUTION INTRAVENOUS at 11:25

## 2017-10-06 RX ADMIN — Medication 2 G: at 11:04

## 2017-10-06 RX ADMIN — SODIUM CHLORIDE, SODIUM LACTATE, POTASSIUM CHLORIDE, AND CALCIUM CHLORIDE 125 ML/HR: 600; 310; 30; 20 INJECTION, SOLUTION INTRAVENOUS at 00:05

## 2017-10-06 RX ADMIN — Medication: at 11:30

## 2017-10-06 RX ADMIN — LIDOCAINE HYDROCHLORIDE AND EPINEPHRINE 20 ML: 20; 5 INJECTION, SOLUTION EPIDURAL; INFILTRATION; INTRACAUDAL; PERINEURAL at 10:50

## 2017-10-06 RX ADMIN — Medication 120 MCG: at 11:45

## 2017-10-06 RX ADMIN — Medication 120 MCG: at 12:15

## 2017-10-06 RX ADMIN — MORPHINE SULFATE 5 MG: 0.5 INJECTION, SOLUTION EPIDURAL; INTRATHECAL; INTRAVENOUS at 11:27

## 2017-10-06 NOTE — PROGRESS NOTES
Pitocin reduced to 4mu for decels. Dr Lisa Elliott was in 10 mins ago and discussed poc with patient. Pt in aggreement to continue with aug unless baby not reassuring. Then pt agrees to cs.

## 2017-10-06 NOTE — PROGRESS NOTES
In to see patient Contractions have become less frequent    Category 1 fetal tracing  Reactive NST   SVE   C/4-5/0  Soft.  Anterior cervix   Continues to leak clear fluid   Discussed plan of care with patient  Given minimal cervical change and now decreased frequency of contractions recommend proceeding with pitocin for augmentation   Patient agreeable   Will powell repeat cervical exam 4-6 hours post adequate contraction pattern q2-3minutes   GBS negative, afebrile

## 2017-10-06 NOTE — ROUTINE PROCESS
Patient OOB with assist. Steady gait noted. Linens changed. Bath and brandon care done. Patient back to bed. Call bell in reach.

## 2017-10-06 NOTE — PROGRESS NOTES
Comfortable w/ epidural.   Pitocin @ 7mL/hr.     Visit Vitals    BP 95/50 (BP 1 Location: Left arm)    Pulse (!) 102    Temp 98.6 °F (37 °C)    Resp 20    Ht 5' 6\" (1.676 m)    Wt 82.6 kg (182 lb)    SpO2 98%    Breastfeeding No    BMI 29.38 kg/m2     Cvx: 5.5/90/0, LOP  [place]: Q2-5 minutes  FHR: 140s, moderate variability, some variable decelerations     Reposition patient

## 2017-10-06 NOTE — PROGRESS NOTES
CTSP due to variable decelerations w/ each contraction    TAUS: ALMA DELIA 5cm, vertex (OP)    Cvx: unchanged  IUPC placed  [place]: Q2-6 min  FHR: 140, moderate variability, deep variable decels with ctx    Amnioinfuse NS  Briefly reviewed potential need for CS if unable to resolve decelerations remote from delivery

## 2017-10-06 NOTE — PROGRESS NOTES
Pitocin up to 6 mu  Contractions q 5 min. fhts with deep variables with good recovery despite amnioinfusion. good variability but frequent enough decelerations  to warrant stopping pitocin. sve 5/80/0. Asynclitic and likely OP. Discussed that I do not feel comfortable increasing pitocin or at this point even restarting pitocin. She has made no cervical change in 10 hours and recommend delivery by c-s due to fetal intolerance of any strong contractions. She understands and is willing to proceed.

## 2017-10-06 NOTE — PROGRESS NOTES
Comfortable. Pitocin @ 5mL/hr. MVU inadequate. Visit Vitals    /57    Pulse 96    Temp 98.2 °F (36.8 °C)    Resp 16    Ht 5' 6\" (1.676 m)    Wt 82.6 kg (182 lb)    SpO2 100%    Breastfeeding No    BMI 29.38 kg/m2     Cvx: 5/80/0, LOP, ongoing edematous anterior lip  [place]: Q4 min  FHR: 130, category 1    Reposition patient. Continue pitocin.

## 2017-10-06 NOTE — PROGRESS NOTES
Bedside shift change report given to POOL Abrams (oncoming nurse) by POOL Davidson RN (offgoing nurse). Report included the following information SBAR, Procedure Summary, Intake/Output, MAR and Recent Results.

## 2017-10-06 NOTE — L&D DELIVERY NOTE
Delivery Summary  Patient: [place]             Circumcision:   desires  Additional Delivery Comments - pltcs. 5 cm x 10 hours. Fetal intolerance of labor      Information for the patient's :  Jessica sorensen [[id number]]       Labor Events:    Labor: No   Rupture Date: 10/5/2017   Rupture Time: 11:00 PM   Rupture Type SROM   Amniotic Fluid Volume:  Moderate    Amniotic Fluid Description:       Induction: None       Augmentation: Oxytocin   Labor Events: Failure to Progress in First Stage     Cervical Ripening:           Delivery Events:  Episiotomy:     Laceration(s):       Repaired:      Number of Repair Packets:     Suture Type and Size:       Estimated Blood Loss (ml):  ml       Delivery Date: 10/6/2017    Delivery Time: 11:16 AM  Delivery Type: , Low Transverse  Sex:  Male     Gestational Age: 36w3d   Delivery Clinician:  Tabatha Arteaga  Living Status: Living   Delivery Location: OR            APGARS  One minute Five minutes Ten minutes   Skin color: 1   1        Heart rate: 2   2        Grimace: 2   2        Muscle tone: 2   2        Breathin   2        Totals: 9   9            Presentation: Vertex    Position:        Resuscitation Method:  Tactile Stimulation     Meconium Stained: None      Cord Vessels: 3 Vessels      Cord Events:    Cord Blood Sent?:  No    Blood Gases Sent?:  No    Placenta:  Date/Time: 10/6 11:17 AM  Removal: Manual Removal      Appearance: Intact     [place] Measurements:  Birth Weight: 3.31 kg      Birth Length: 52.7 cm      Head Circumference: 33.5 cm      Chest Circumference: 31.5 cm     Abdominal Girth: 32.5 cm    Other Providers:   NICK ALMANZAR;KERWIN PADGETT;OSKAR FRENCH;;;JAIRO MEJIA;BRUNILDA JOSEPH JR;;;MADHU BRANTLEY;Bart PINON, Obstetrician;Primary Nurse;Primary  Nurse;Nicu Nurse;Neonatologist;Anesthesiologist;Crna;Nurse Practitioner;Midwife;Nursery Nurse;Scrub Tech           Cord pH:  none    Episiotomy: Laceration(s):       Estimated Blood Loss (ml): 700    Labor Events  Method: None      Augmentation: Oxytocin   Cervical Ripening:                Operative Vaginal Delivery - none    Group B Strep:   Lab Results   Component Value Date/Time    Ronna, External neg 09/15/2017     Information for the patient's :  Radha Miller [[id number]]   No results found for: ABORH, PCTABR, PCTDIG, BILI, ABORHEXT, ABORH    No results found for: APH, APCO2, APO2, AHCO3, ABEC, ABDC, O2ST, EPHV, PCO2V, PO2V, HCO3V, EBEV, EBDV, SITE, RSCOM

## 2017-10-06 NOTE — PROGRESS NOTES
Variable decelerations resolved w/ amnioinfusion. Pitocin increased to 9mL/hr. MVU inadequate. Cvx: unchanged 5.5/90/0, LOP   Anterior lip edematous  [place]: Q2 min  FHR: 140, moderate variability with late decelerations    Pitocin discontinued. Discussed lack of cervical change, fetal malposition and NRFC. Late decelerations resolved during discussion. Discussed  delivery. Patient hesitant to proceed. Given reassuring fetal status s/p discontinuation of pitocin, gave patient option of restarted pitocin at 1mL/hr. She and  opt to do this. If [address] resumes, will proceed with .

## 2017-10-06 NOTE — ANESTHESIA POSTPROCEDURE EVALUATION
Post-Anesthesia Evaluation and Assessment    Patient: Peyman George MRN: [id number]  SSN: [id number]    YOB: 1985  Age: 28 y.o. Sex: female       Cardiovascular Function/Vital Signs  Visit Vitals    /81    Pulse (!) 102    Temp 36.9 °C (98.4 °F)    Resp 18    Ht 5' 6\" (1.676 m)    Wt 82.6 kg (182 lb)    SpO2 100%    Breastfeeding No    BMI 29.38 kg/m2       Patient is status post spinal, epidural anesthesia for Procedure(s):   SECTION. Nausea/Vomiting: None    Postoperative hydration reviewed and adequate. Pain:  Pain Scale 1: Numeric (0 - 10) (10/06/17 0856)  Pain Intensity 1: 0 (10/06/17 0856)   Managed    Neurological Status: At baseline    Mental Status and Level of Consciousness: Arousable    Pulmonary Status:   O2 Device: Room air (10/06/17 1210)   Adequate oxygenation and airway patent    Complications related to anesthesia: None    Post-anesthesia assessment completed.  No concerns    Signed By: Armani Scott MD     2017

## 2017-10-07 LAB
BASOPHILS # BLD: 0 K/UL
BASOPHILS NFR BLD: 0 %
DIFFERENTIAL METHOD BLD: ABNORMAL
EOSINOPHIL # BLD: 0 K/UL
EOSINOPHIL NFR BLD: 0 %
ERYTHROCYTE [DISTWIDTH] IN BLOOD BY AUTOMATED COUNT: 12.9 % (ref 11.5–14.5)
HCT VFR BLD AUTO: 31.3 % (ref 35–47)
HGB BLD-MCNC: 10.8 G/DL (ref 11.5–16)
LYMPHOCYTES # BLD: 1 K/UL
LYMPHOCYTES NFR BLD: 4 %
MCH RBC QN AUTO: 32 PG (ref 26–34)
MCHC RBC AUTO-ENTMCNC: 34.5 G/DL (ref 30–36.5)
MCV RBC AUTO: 92.9 FL (ref 80–99)
MONOCYTES # BLD: 1.7 K/UL
MONOCYTES NFR BLD: 7 %
NEUTS BAND NFR BLD MANUAL: 5 %
NEUTS SEG # BLD: 21.5 K/UL
NEUTS SEG NFR BLD: 84 %
PLATELET # BLD AUTO: 142 K/UL (ref 150–400)
RBC # BLD AUTO: 3.37 M/UL (ref 3.8–5.2)
RBC MORPH BLD: ABNORMAL
WBC # BLD AUTO: 24.2 K/UL (ref 3.6–11)

## 2017-10-07 PROCEDURE — 85025 COMPLETE CBC W/AUTO DIFF WBC: CPT | Performed by: OBSTETRICS & GYNECOLOGY

## 2017-10-07 PROCEDURE — 74011250637 HC RX REV CODE- 250/637: Performed by: OBSTETRICS & GYNECOLOGY

## 2017-10-07 PROCEDURE — 36415 COLL VENOUS BLD VENIPUNCTURE: CPT | Performed by: OBSTETRICS & GYNECOLOGY

## 2017-10-07 PROCEDURE — 65410000002 HC RM PRIVATE OB

## 2017-10-07 RX ORDER — HYDROXYZINE 25 MG/1
25 TABLET, FILM COATED ORAL
Status: DISCONTINUED | OUTPATIENT
Start: 2017-10-07 | End: 2017-10-08 | Stop reason: HOSPADM

## 2017-10-07 RX ADMIN — IBUPROFEN 800 MG: 400 TABLET, FILM COATED ORAL at 08:22

## 2017-10-07 RX ADMIN — IBUPROFEN 800 MG: 400 TABLET, FILM COATED ORAL at 17:15

## 2017-10-07 RX ADMIN — HYDROXYZINE HYDROCHLORIDE 25 MG: 25 TABLET, FILM COATED ORAL at 11:59

## 2017-10-07 RX ADMIN — IBUPROFEN 800 MG: 400 TABLET, FILM COATED ORAL at 00:24

## 2017-10-07 NOTE — PROGRESS NOTES
Post-Operative  Day 1    Sammi Jerry     Assessment: Post-Op day 1, stable    Plan:   1. Routine post-operative care   2. The risks and benefits of the circumcision  procedure and anesthesia including: bleeding, infection, variability of cosmetic results were discussed at length with the mother. She is aware that future repeat procedures may be necessary. She gives informed consent to proceed as noted and her questions are answered. Information for the patient's :  Rachael Halsted sade [[id number]]   , Low Transverse    S: Patient doing well without significant complaint. Nausea and vomiting resolved, tolerating liquids, no flatus, guerrero in place. Her main complaint this morning is of itching, otherwise she feels well. Vitals:  Visit Vitals    /69 (BP 1 Location: Left arm, BP Patient Position: Post activity; Sitting)    Pulse (!) 105    Temp 98.3 °F (36.8 °C)    Resp 16    Ht 5' 6\" (1.676 m)    Wt 82.6 kg (182 lb)    SpO2 100%    Breastfeeding Unknown    BMI 29.38 kg/m2     Temp (24hrs), Av.4 °F (36.9 °C), Min:97.7 °F (36.5 °C), Max:99.1 °F (37.3 °C)      Last 24hr Input/Output:    Intake/Output Summary (Last 24 hours) at 10/07/17 0906  Last data filed at 10/07/17 0600   Gross per 24 hour   Intake             3837 ml   Output             6000 ml   Net            -2163 ml          Exam:        Patient without distress. Lungs clear. Abdomen, bowel sounds present, soft, expected tenderness, fundus firm Wound dressing intact     Perineum normal lochia noted               Lower extremities are negative for swelling, cords or tenderness.     Labs:   Lab Results   Component Value Date/Time    WBC 24.2 10/07/2017 02:59 AM    WBC 9.1 10/05/2017 02:47 AM    HGB 10.8 10/07/2017 02:59 AM    HGB 13.5 10/05/2017 02:47 AM    HCT 31.3 10/07/2017 02:59 AM    HCT 38.9 10/05/2017 02:47 AM    PLATELET 234 2637 02:59 AM    PLATELET 850  02:47 AM       Recent Results (from the past 24 hour(s))   CBC WITH AUTOMATED DIFF    Collection Time: 10/07/17  2:59 AM   Result Value Ref Range    WBC 24.2 (H) 3.6 - 11.0 K/uL    RBC 3.37 (L) 3.80 - 5.20 M/uL    HGB 10.8 (L) 11.5 - 16.0 g/dL    HCT 31.3 (L) 35.0 - 47.0 %    MCV 92.9 80.0 - 99.0 FL    MCH 32.0 26.0 - 34.0 PG    MCHC 34.5 30.0 - 36.5 g/dL    RDW 12.9 11.5 - 14.5 %    PLATELET 536 (L) 888 - 400 K/uL    NEUTROPHILS 84 %    BAND NEUTROPHILS 5 %    LYMPHOCYTES 4 %    MONOCYTES 7 %    EOSINOPHILS 0 %    BASOPHILS 0 %    ABS. NEUTROPHILS 21.5 K/UL    ABS. LYMPHOCYTES 1.0 K/UL    ABS. MONOCYTES 1.7 K/UL    ABS. EOSINOPHILS 0.0 K/UL    ABS.  BASOPHILS 0.0 K/UL    RBC COMMENTS NORMOCYTIC, NORMOCHROMIC      DF MANUAL

## 2017-10-07 NOTE — PROGRESS NOTES
Bedside shift change report given to POOL Greenberg (oncoming nurse) by A. Thora Nyhan, RN (offgoing nurse). Report included the following information SBAR, Procedure Summary, Intake/Output, MAR and Recent Results.

## 2017-10-07 NOTE — ROUTINE PROCESS
Bedside and Verbal shift change report given to POOL Estrella RN (oncoming nurse) by SHARON Villanueva-MNN (offgoing nurse).  Report included the following information SBAR, Procedure Summary, Intake/Output, MAR and Recent Results.

## 2017-10-07 NOTE — PROGRESS NOTES
Patient removed IV tape; stated it was 'dirty'. IV cath bent. Replaced tape to secure IV. Appears patent. No signs;symptoms of infiltration. Enc patient to notify if site feels uncomfortable or swells. Enc not to get OOB without nurse or tech at side to assist for safety precautions. Verbalized understanding and willingness. 2230: patient standing at side of bed. Denies light headedness and dizziness. Enc to ambulate to bathroom to change sanitary napkin and orient to brandon care. Gait even and steady. Informed may get OOB without nurse at side but to notify if requires assist. Self brandon care performed WDL. Guerrero care completed. Noted guerrero cath secure no longer on right thigh. Patient states it must have come off somehow. Asked patient if she would like it replaced. Patient declines IV cath secure but would like to keep guerrero catheter in for noc. Returned to bed without difficulty. Noted IV site leaking and slight infiltration beginning. IV dc'd. shlomo well. Enc to drink fluids. Verbalized willingness and understanding. 0259: CBC drawn x1 attempt via right antecubital. shlomo well. Sample to lab.

## 2017-10-07 NOTE — PROGRESS NOTES
Duramorph Follow-Up Note    1 Day Post-Op sp Procedure(s):   SECTION. /69 (BP 1 Location: Left arm, BP Patient Position: Post activity; Sitting)  Pulse (!) 105  Temp 36.8 °C (98.3 °F)  Resp 16  Ht 5' 6\" (1.676 m)  Wt 82.6 kg (182 lb)  SpO2 100%  Breastfeeding? Unknown  BMI 29.38 kg/m2. Patient is POD-1 S/P intrathecal duramorph. Pain is moderately controlled  Patient reports no headache, fever, weakness or numbness. Epidural/spinal tap site is clean, dry and intact. No obvious Anesthesia complications noted. Plan:    Pain management as per primary service.

## 2017-10-08 VITALS
BODY MASS INDEX: 29.25 KG/M2 | DIASTOLIC BLOOD PRESSURE: 73 MMHG | HEIGHT: 66 IN | OXYGEN SATURATION: 100 % | SYSTOLIC BLOOD PRESSURE: 103 MMHG | WEIGHT: 182 LBS | HEART RATE: 81 BPM | RESPIRATION RATE: 17 BRPM | TEMPERATURE: 98 F

## 2017-10-08 LAB
BASOPHILS # BLD: 0 K/UL (ref 0–0.1)
BASOPHILS NFR BLD: 0 % (ref 0–1)
EOSINOPHIL # BLD: 0.2 K/UL (ref 0–0.4)
EOSINOPHIL NFR BLD: 1 % (ref 0–7)
ERYTHROCYTE [DISTWIDTH] IN BLOOD BY AUTOMATED COUNT: 13.1 % (ref 11.5–14.5)
HCT VFR BLD AUTO: 29.7 % (ref 35–47)
HGB BLD-MCNC: 10 G/DL (ref 11.5–16)
LYMPHOCYTES # BLD: 1.5 K/UL (ref 0.8–3.5)
LYMPHOCYTES NFR BLD: 10 % (ref 12–49)
MCH RBC QN AUTO: 31.5 PG (ref 26–34)
MCHC RBC AUTO-ENTMCNC: 33.7 G/DL (ref 30–36.5)
MCV RBC AUTO: 93.7 FL (ref 80–99)
MONOCYTES # BLD: 1 K/UL (ref 0–1)
MONOCYTES NFR BLD: 7 % (ref 5–13)
NEUTS SEG # BLD: 12.5 K/UL (ref 1.8–8)
NEUTS SEG NFR BLD: 82 % (ref 32–75)
PLATELET # BLD AUTO: 141 K/UL (ref 150–400)
RBC # BLD AUTO: 3.17 M/UL (ref 3.8–5.2)
WBC # BLD AUTO: 15.2 K/UL (ref 3.6–11)

## 2017-10-08 PROCEDURE — 36415 COLL VENOUS BLD VENIPUNCTURE: CPT

## 2017-10-08 PROCEDURE — 85025 COMPLETE CBC W/AUTO DIFF WBC: CPT

## 2017-10-08 PROCEDURE — 74011250637 HC RX REV CODE- 250/637: Performed by: OBSTETRICS & GYNECOLOGY

## 2017-10-08 RX ORDER — IBUPROFEN 600 MG/1
600 TABLET ORAL
Qty: 40 TAB | Refills: 2 | Status: SHIPPED | OUTPATIENT
Start: 2017-10-08 | End: 2021-11-12 | Stop reason: ALTCHOICE

## 2017-10-08 RX ORDER — LANOLIN ALCOHOL/MO/W.PET/CERES
325 CREAM (GRAM) TOPICAL
Qty: 60 TAB | Refills: 0 | Status: SHIPPED | OUTPATIENT
Start: 2017-10-08 | End: 2021-11-12

## 2017-10-08 RX ORDER — OXYCODONE AND ACETAMINOPHEN 5; 325 MG/1; MG/1
1 TABLET ORAL
Qty: 30 TAB | Refills: 0 | Status: SHIPPED | OUTPATIENT
Start: 2017-10-08 | End: 2021-11-12 | Stop reason: ALTCHOICE

## 2017-10-08 RX ADMIN — IBUPROFEN 800 MG: 400 TABLET, FILM COATED ORAL at 09:16

## 2017-10-08 RX ADMIN — IBUPROFEN 800 MG: 400 TABLET, FILM COATED ORAL at 01:31

## 2017-10-08 NOTE — DISCHARGE INSTRUCTIONS
POST DELIVERY DISCHARGE INSTRUCTIONS    Name: Aretta Bence  YOB: 1985  Primary Diagnosis: Active Problems:    Pregnancy (10/5/2017)        General:     Diet/Diet Restrictions:  · Eight 8-ounce glasses of fluid daily (water, juices); avoid excessive caffeine intake. · Meals/snacks as desired which are high in fiber and carbohydrates and low in fat and cholesterol. Physical Activity / Restrictions / Safety:     · Avoid heavy lifting, no more that 8 lbs. For 2-3 weeks;   · Limit use of stairs to 2 times daily for the first week home. · No driving for one week. · Avoid intercourse 4-6 weeks, no douching or tampon use. · Check with obstetrician before starting or resuming an exercise program.      Discharge Instructions/Special Treatment/Home Care Needs:     · Continue prenatal vitamins. · Continue to use squirt bottle with warm water on your episiotomy after each bathroom use until bleeding stops. · If steri-strips applied to your incision, remove in 7-10 days. Call your doctor for the following:     · Fever over 101 degrees by mouth. · Vaginal bleeding heavier than a normal menstrual period or clots larger than a golf ball. · Red streaks or increased swelling of legs, painful red streaks on your breast.  · Painful urination, constipation and increased pain or swelling or discharge with your incision. · If you feel extremely anxious or overwhelmed. · If you have thoughts of harming yourself and/or your baby. Pain Management:     · Take Acetaminophen (Tylenol) or Ibuprofen (Advil, Motrin), as directed for pain. · Use a warm Sitz bath 3 times daily to relieve episiotomy or hemorrhoidal discomfort. · For hemorrhoidal discomfort, use Tucks and Anusol cream as needed and directed. · Heating pad to  incision as needed.      Follow-Up Care:     Appointment with MD:   Follow-up Appointments   Procedures    FOLLOW UP VISIT Appointment in: 6 Weeks     Standing Status: Standing     Number of Occurrences:   1     Order Specific Question:   Appointment in     Answer:   6 Weeks     Telephone number: [phone]    Signed By: Elizabeth Alonso MD                                                                                                   Date: 10/8/2017 Time: 11:01 AM

## 2017-10-08 NOTE — PROGRESS NOTES
Post-Operative  Day 2    Sammi Jerry     Assessment: Post-Op day 2, doing well    Plan:   1. Routine post-operative care  2. Desires early discharge today. Discharge instructions and rxs given. 3. Circ today    Information for the patient's :  Anjel sorensen [[id number]]   , Low Transverse  S: Patient doing well without significant complaint. Nausea and vomiting resolved, tolerating liquids, passing flatus, voiding and ambulating without difficulty. Vitals:  Visit Vitals    /73 (BP 1 Location: Left arm, BP Patient Position: Head of bed elevated (Comment degrees); Supine; At rest)    Pulse 81    Temp 98 °F (36.7 °C)    Resp 17    Ht 5' 6\" (1.676 m)    Wt 82.6 kg (182 lb)    SpO2 100%    Breastfeeding Unknown    BMI 29.38 kg/m2     Temp (24hrs), Av.1 °F (36.7 °C), Min:98 °F (36.7 °C), Max:98.3 °F (36.8 °C)        Exam:        Patient without distress. Abdomen, bowel sounds present, soft, expected tenderness, fundus firm                Wound incision clean, dry and intact               Lower extremities are negative for swelling, cords or tenderness.     Labs:   Lab Results   Component Value Date/Time    WBC 15.2 10/08/2017 06:13 AM    WBC 24.2 10/07/2017 02:59 AM    WBC 9.1 10/05/2017 02:47 AM    HGB 10.0 10/08/2017 06:13 AM    HGB 10.8 10/07/2017 02:59 AM    HGB 13.5 10/05/2017 02:47 AM    HCT 29.7 10/08/2017 06:13 AM    HCT 31.3 10/07/2017 02:59 AM    HCT 38.9 10/05/2017 02:47 AM    PLATELET 550  06:13 AM    PLATELET 937  02:59 AM    PLATELET 560  02:47 AM       Recent Results (from the past 24 hour(s))   CBC WITH AUTOMATED DIFF    Collection Time: 10/08/17  6:13 AM   Result Value Ref Range    WBC 15.2 (H) 3.6 - 11.0 K/uL    RBC 3.17 (L) 3.80 - 5.20 M/uL    HGB 10.0 (L) 11.5 - 16.0 g/dL    HCT 29.7 (L) 35.0 - 47.0 %    MCV 93.7 80.0 - 99.0 FL    MCH 31.5 26.0 - 34.0 PG    MCHC 33.7 30.0 - 36.5 g/dL    RDW 13.1 11.5 - 14.5 % PLATELET 800 (L) 844 - 400 K/uL    NEUTROPHILS 82 (H) 32 - 75 %    LYMPHOCYTES 10 (L) 12 - 49 %    MONOCYTES 7 5 - 13 %    EOSINOPHILS 1 0 - 7 %    BASOPHILS 0 0 - 1 %    ABS. NEUTROPHILS 12.5 (H) 1.8 - 8.0 K/UL    ABS. LYMPHOCYTES 1.5 0.8 - 3.5 K/UL    ABS. MONOCYTES 1.0 0.0 - 1.0 K/UL    ABS. EOSINOPHILS 0.2 0.0 - 0.4 K/UL    ABS.  BASOPHILS 0.0 0.0 - 0.1 K/UL

## 2017-10-08 NOTE — DISCHARGE SUMMARY
Obstetrical Discharge Summary     Name: Samantha Velez MRN: [id number]  SSN: [id number]    YOB: 1985  Age: 28 y.o. Sex: female      Admit Date: 10/5/2017    Discharge Date: 10/8/2017     Admitting Physician: Annamaria Rivera MD     Attending Physician:  Eugenia Guzmán MD     Admission Diagnoses: Leaking Fluid  Pregnancy    Discharge Diagnoses:   Information for the patient's :  Annia sorensen [[id number]]   Delivery of a 3.31 kg male infant via , Low Transverse on 10/6/2017 at 11:16 AM  by . Apgars were 9 and 9. Additional Diagnoses:   Hospital Problems  Date Reviewed: 10/6/2017          Codes Class Noted POA    Pregnancy ICD-10-CM: Z34.90  ICD-9-CM: V22.2  10/5/2017 Unknown             Lab Results   Component Value Date/Time    Rubella, External 12.40 2017    GrBStrep, External neg 09/15/2017       Hospital Course: Normal hospital course following the delivery. Disposition at Discharge: Home or self care    Discharged Condition: Stable    Patient Instructions:   Current Discharge Medication List      START taking these medications    Details   ibuprofen (MOTRIN) 600 mg tablet Take 1 Tab by mouth every six (6) hours as needed for Pain. Qty: 40 Tab, Refills: 2      oxyCODONE-acetaminophen (PERCOCET) 5-325 mg per tablet Take 1 Tab by mouth every six (6) hours as needed for Pain. Max Daily Amount: 4 Tabs. Qty: 30 Tab, Refills: 0      ferrous sulfate 325 mg (65 mg iron) tablet Take 1 Tab by mouth daily (after breakfast). Qty: 60 Tab, Refills: 0         CONTINUE these medications which have NOT CHANGED    Details   PNV38-Iron Cbn&Gluc-FA-DSS-DHA 35-1- mg cmpk Take  by mouth. Reference my discharge instructions.     Follow-up Appointments   Procedures    FOLLOW UP VISIT Appointment in: 6 Weeks     Standing Status:   Standing     Number of Occurrences:   1     Order Specific Question:   Appointment in     Answer:   6 Weeks        Signed By:  Joana Holly MD     October 8, 2017

## 2017-10-08 NOTE — PROGRESS NOTES
Noted WBC elevated from morning labs. HR sl tachycardic with sl low bp remains. C/o burning sensation right side incision area. Noted right side sl reddened where tape was removed. Reassured. Denies malodorous urine and denies burning with urination. Temp WDL. Noted prolonged ROM. MD [address] notified.  Orders received to repeat CBC in am.

## 2017-10-08 NOTE — ROUTINE PROCESS
Bedside and Verbal shift change report given to POOL Lozano RN (oncoming nurse) by SHARON Villanueva-MNN (offgoing nurse). Report included the following information SBAR, Intake/Output, MAR and Recent Results.

## 2019-11-26 NOTE — PROGRESS NOTES
0300 Bedside report received from [facility], [facility]  and care assumed. Report given with SBAR , recent labs, last cervical exam  and MAR . Pt currently on 9 mu pitocin ctx q 2 min . Amnio infusion completed now running maintenance dose of 150 ml /hr .     0311 decel noted to 60 bpm  X 3 over 10 min period . Pitocin off at this time will continue to monitor and Dr. Carlene Beebe to be made aware. 6202 Dr. Carlene Beebe at bedside discussing possible c/s for continued decels with no cervical change . Pt and  discussing options . 0400 pitocin restarted at 1 will continue pitocin as tolerated by fetus     0700Bedside shift change report given to Manuel Etienne  (oncoming nurse) by me (offgoing nurse). Report given with SBAR, MAR and Recent Results. Avoid NSAID's (nonsteroidal anti-inflammatory drugs), aspirin, and alcohol for the week preceding surgery.

## 2021-11-09 ENCOUNTER — TELEPHONE (OUTPATIENT)
Dept: INTERNAL MEDICINE CLINIC | Age: 36
End: 2021-11-09

## 2021-11-09 NOTE — TELEPHONE ENCOUNTER
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

## 2021-11-12 ENCOUNTER — OFFICE VISIT (OUTPATIENT)
Dept: INTERNAL MEDICINE CLINIC | Age: 36
End: 2021-11-12
Attending: PHYSICIAN ASSISTANT
Payer: COMMERCIAL

## 2021-11-12 ENCOUNTER — HOSPITAL ENCOUNTER (OUTPATIENT)
Dept: CT IMAGING | Age: 36
Discharge: HOME | End: 2021-11-12
Attending: PHYSICIAN ASSISTANT
Payer: COMMERCIAL

## 2021-11-12 VITALS
WEIGHT: 166.8 LBS | HEART RATE: 83 BPM | DIASTOLIC BLOOD PRESSURE: 80 MMHG | SYSTOLIC BLOOD PRESSURE: 114 MMHG | TEMPERATURE: 98.3 F | OXYGEN SATURATION: 97 % | HEIGHT: 66 IN | BODY MASS INDEX: 26.81 KG/M2 | RESPIRATION RATE: 20 BRPM

## 2021-11-12 DIAGNOSIS — R10.31 RIGHT LOWER QUADRANT PAIN: ICD-10-CM

## 2021-11-12 DIAGNOSIS — R10.31 RIGHT LOWER QUADRANT PAIN: Primary | ICD-10-CM

## 2021-11-12 LAB
BILIRUB UR QL STRIP: NEGATIVE
GLUCOSE UR-MCNC: NEGATIVE MG/DL
KETONES P FAST UR STRIP-MCNC: NEGATIVE MG/DL
PH UR STRIP: 6.5 [PH] (ref 4.6–8)
PROT UR QL STRIP: NEGATIVE
SP GR UR STRIP: 1.02 (ref 1–1.03)
UA UROBILINOGEN AMB POC: NORMAL (ref 0.2–1)
URINALYSIS CLARITY POC: CLEAR
URINALYSIS COLOR POC: YELLOW
URINE BLOOD POC: NEGATIVE
URINE LEUKOCYTES POC: NORMAL
URINE NITRITES POC: NEGATIVE

## 2021-11-12 PROCEDURE — 74011000250 HC RX REV CODE- 250: Performed by: PHYSICIAN ASSISTANT

## 2021-11-12 PROCEDURE — 74176 CT ABD & PELVIS W/O CONTRAST: CPT

## 2021-11-12 PROCEDURE — 81002 URINALYSIS NONAUTO W/O SCOPE: CPT | Performed by: PHYSICIAN ASSISTANT

## 2021-11-12 PROCEDURE — 99204 OFFICE O/P NEW MOD 45 MIN: CPT | Performed by: PHYSICIAN ASSISTANT

## 2021-11-12 RX ORDER — BARIUM SULFATE 20 MG/ML
900 SUSPENSION ORAL
Status: COMPLETED | OUTPATIENT
Start: 2021-11-12 | End: 2021-11-12

## 2021-11-12 RX ADMIN — BARIUM SULFATE 900 ML: 20 SUSPENSION ORAL at 20:00

## 2021-11-12 NOTE — PROGRESS NOTES
1. Have you been to the ER, urgent care clinic since your last visit? Hospitalized since your last visit? No    2. Have you seen or consulted any other health care providers outside of the [address] [place] [address] since your last visit? Include any pap smears or colon screening. Yes, GYN, Gaby Wheeler. Dr. Rafael Aquino, Fertility. Health Maintenance Due   Topic Date Due    Hepatitis C Screening  Never done    COVID-19 Vaccine (1) Never done    Cervical cancer screen  04/21/2016    Flu Vaccine (1) 09/01/2021     Patient c/o stomach x1 month and pelvic pain x2 weeks, mid lower abdomen and pelvic pain right hand side, bladder irritation. Urine sample taken at GYN this morning, US scheduled next week.

## 2021-11-12 NOTE — PROGRESS NOTES
Chief Complaint   Patient presents with   [address]     she is a 39y.o. year old female who presents for evaluation. Patient presents to the office to get established as well as for lower right abdominal pain. The patient reports that she has seen her gynecologist in reference to this. Patient states that she is scheduled to have an ultrasound next Wednesday. The patient reports that the pain started initially 1 month ago. She reports however for the past 2 weeks the pain has increased. The patient states that at first she thought that maybe was related to her cycle but the pain has continued. The patient reports that she has seen her GYN doctor twice. Doing a pelvic exam nothing was found. She is scheduled to have an ultrasound done next Wednesday. The patient is here today because she feels as though something else could be possibly going on. The patient states she is having pain currently of 6 out of 10. The patient also reports that she feels as though her bladder is irritated. The patient denies fever or chills. She denies nausea. Patient reports that her appendix is still present. Reviewed and agree with Nurse Note and duplicated in this note. Reviewed PmHx, RxHx, FmHx, SocHx, AllgHx and updated and dated in the chart. Review of Systems - negative except as listed above    Objective:     Vitals:    11/12/21 1403   BP: 114/80   Pulse: 83   Resp: 20   Temp: 98.3 °F (36.8 °C)   SpO2: 97%   Weight: 166 lb 12.8 oz (75.7 kg)   Height: 5' 6\" (1.676 m)     Physical Examination: General appearance - alert, well appearing, and in no distress  Mental status - normal mood, behavior, speech, dress, motor activity, and thought processes  Abdomen - tenderness noted tenderness noted of the lower right quadrant. The patient has some mild guarding with palpation. Assessment/ Plan:   Diagnoses and all orders for this visit:    1.  Right lower quadrant pain  -     CT ABD W CONT AND PELVIS W WO CONT; Future  -     CULTURE, URINE; Future  -     AMB POC URINALYSIS DIP STICK MANUAL W/O MICRO    A urinalysis was performed in the office today. Only a trace of leuks was seen. We will follow this test up with a urine culture. patient will have a CT scan of abdomen done to look for the cause of her persistent lower right quadrant pain. I have discussed the diagnosis with the patient and the intended plan as seen in the above orders. The patient has received an after-visit summary and questions were answered concerning future plans.      Medication Side Effects and Warnings were discussed with patient: n/a  Patient Labs were reviewed and or requested: yes  Patient Past Records were reviewed and or requested  yes    Chanelle Dc PA-C

## 2021-11-14 LAB — BACTERIA UR CULT: NORMAL

## 2021-12-15 LAB — SARS-COV-2, NAA: NEGATIVE

## 2021-12-28 ENCOUNTER — VIRTUAL VISIT (OUTPATIENT)
Dept: INTERNAL MEDICINE CLINIC | Age: 36
End: 2021-12-28
Payer: COMMERCIAL

## 2021-12-28 DIAGNOSIS — H65.02 NON-RECURRENT ACUTE SEROUS OTITIS MEDIA OF LEFT EAR: ICD-10-CM

## 2021-12-28 DIAGNOSIS — E55.9 VITAMIN D DEFICIENCY: ICD-10-CM

## 2021-12-28 DIAGNOSIS — E27.1 PRIMARY ADRENOCORTICAL INSUFFICIENCY (HCC): Primary | ICD-10-CM

## 2021-12-28 PROBLEM — Z34.90 PREGNANCY: Status: RESOLVED | Noted: 2017-10-05 | Resolved: 2021-12-28

## 2021-12-28 PROBLEM — E28.39 PREMATURE OVARIAN INSUFFICIENCY: Status: ACTIVE | Noted: 2021-12-28

## 2021-12-28 PROCEDURE — 99214 OFFICE O/P EST MOD 30 MIN: CPT | Performed by: PHYSICIAN ASSISTANT

## 2021-12-28 RX ORDER — CHOLECALCIFEROL (VITAMIN D3) 125 MCG
CAPSULE ORAL
COMMUNITY

## 2021-12-28 RX ORDER — ACETAMINOPHEN 500 MG
2000 TABLET ORAL DAILY
COMMUNITY
Start: 2021-12-28

## 2021-12-28 RX ORDER — PSEUDOEPHEDRINE HCL 30 MG
30 TABLET ORAL
Qty: 60 TABLET | Refills: 0 | Status: SHIPPED | OUTPATIENT
Start: 2021-12-28

## 2021-12-28 NOTE — PROGRESS NOTES
1. Have you been to the ER, urgent care clinic since your last visit? Hospitalized since your last visit? No    2. Have you seen or consulted any other health care providers outside of the [address] [place] [address] since your last visit? Include any pap smears or colon screening.  Yes    Chief Complaint   Patient presents with    Sore Throat     for the last 2 weeks    Ear Pain      please send link to [phone]

## 2021-12-28 NOTE — PROGRESS NOTES
Graeme Ramirez is a 39 y.o. female who was seen by synchronous (real-time) audio-video technology on 12/28/2021    Consent: Graeme Ramirez, who was seen by synchronous (real-time) audio-video technology, and/or her healthcare decision maker, is aware that this patient-initiated, Telehealth encounter on 12/28/2021 is a billable service, with coverage as determined by her insurance carrier. She is aware that she may receive a bill and has provided verbal consent to proceed: YES  Subjective:   Graeme Ramirez is a 39 y.o. female who was seen for Sore Throat (for the last 2 weeks) and Ear Pain    Chanelle Dc PA-C's patient in to see me today for an acute visit. Sick x 2 weeks. Tested negative for COVID, Strep, Flu on 12/15. Cough and nasal congestion has resolved, but L ear pain persists. No fever. Seeing endocrinology for Primary Adrenocortical Insufficiency & Premature Ovarian Insufficiency. Hx Vit D def. Not currently taking supplement. Health Maintenance Due   Topic Date Due    Hepatitis C Screening  Never done    COVID-19 Vaccine (1) Never done    Cervical cancer screen  04/21/2016    Flu Vaccine (1) 09/01/2021       Review of Systems   Respiratory: Negative for shortness of breath. Cardiovascular: Negative for chest pain and palpitations. Neurological: Negative for dizziness, loss of consciousness and weakness. Objective:     Patient-Reported Vitals 12/27/2021   Patient-Reported Weight 164   Patient-Reported Height 5,6   Patient-Reported LMP 11/3/2021      General: alert, cooperative, no distress   Mental  status: normal mood, behavior, speech, dress, motor activity, and thought processes, able to follow commands   HENT: NCAT. Nasal mucosa and pharynx WNL.     Neck: no visualized mass   Resp: no respiratory distress   Neuro: no gross deficits   Skin: no discoloration or lesions of concern on visible areas   Psychiatric: normal affect, consistent with stated mood, no evidence of hallucinations     Assessment & Plan:     Encounter Diagnoses     ICD-10-CM ICD-9-CM   1. Primary adrenocortical insufficiency (HCC)  E27.1 255.41   2. Vitamin D deficiency  E55.9 268.9   3. Non-recurrent acute serous otitis media of left ear (presumed) H65.02 381.01     Orders Placed This Encounter    Start daily cholecalciferol (VITAMIN D3) (2,000 UNITS /50 MCG) cap capsule    pseudoephedrine (Sudafed) 30 mg tablet     Follow up with Endocrine as planned. We discussed the expected course, resolution and complications of the diagnosis(es) in detail. Medication risks, benefits, costs, interactions, and alternatives were discussed as indicated. I advised her to contact the office if her condition worsens, changes or fails to improve as anticipated. She expressed understanding with the diagnosis(es) and plan. Elijah Cedillo is a 39 y.o. female who was evaluated by a video visit encounter for concerns as above. Patient identification was verified prior to start of the visit. A caregiver was present when appropriate. Due to this being a TeleHealth encounter (During [id number] public health emergency), evaluation of the following organ systems was limited: Vitals/Constitutional/EENT/Resp/CV/GI//MS/Neuro/Skin/Heme-Lymph-Imm. Pursuant to the emergency declaration under the [facility], 1135 waiver authority and the [employer], this Virtual  Visit was conducted, with patient's (and/or legal guardian's) consent, to reduce the patient's risk of exposure to COVID-19 and provide necessary medical care. Services were provided through a video synchronous discussion virtually to substitute for in-person clinic visit. Patient and provider were located at their individual homes.       [place] PA-C
